# Patient Record
Sex: MALE | Race: AMERICAN INDIAN OR ALASKA NATIVE
[De-identification: names, ages, dates, MRNs, and addresses within clinical notes are randomized per-mention and may not be internally consistent; named-entity substitution may affect disease eponyms.]

---

## 2022-09-06 ENCOUNTER — HOSPITAL ENCOUNTER (OUTPATIENT)
Dept: HOSPITAL 5 - US | Age: 57
Discharge: HOME | End: 2022-09-06
Attending: FAMILY MEDICINE
Payer: COMMERCIAL

## 2022-09-06 DIAGNOSIS — E04.1: Primary | ICD-10-CM

## 2022-09-06 DIAGNOSIS — E05.90: ICD-10-CM

## 2022-09-06 PROCEDURE — 76536 US EXAM OF HEAD AND NECK: CPT

## 2022-09-06 NOTE — ULTRASOUND REPORT
ULTRASOUND THYROID



INDICATION / CLINICAL INFORMATION: Thyrotoxicosis, unspecified E05.90.



COMPARISON: None available.



FINDINGS:



RIGHT LOBE: Size (cm) = 3.8 x 1.4 x 1.5 

LEFT LOBE: Size (cm) = 4.4 x 1.8 x 1.5

ISTHMUS: Thickness (cm) = 0.5 

APPEARANCE: Normal.



SMALL NODULES < 1 cm: 9 mm hypoechoic nodule in the mid left thyroid lobe (TR 4).



NODULES >= 1 cm or SUSPICIOUS FEATURES (up to 4):  

- NODULE # 1

  - Location: Right Upper 

  - Maximum Size (cm): 0.8 

     - Significant Change (>= 20% in 2 dim. & inc. >= 2mm): No prior study

  - Composition: Solid = 2 points

  - Echogenicity: Hypoechoic = 2 points

  - Shape: Taller-than-wide = 3 points

  - Margin: Smooth = 0 points

  - Echogenic Foci: None = 0 points

     - Additional Echogenic Foci: None = 0 points

  - Additional Findings: None.

  - ACR TI-RADS Score / Category = 7+ points / TI-RADS 5 



- No additional suspicious thyroid nodules.



LYMPH NODES: No abnormal lymph nodes.

PARATHYROID GLANDS: No abnormal parathyroid glands.

ADDITIONAL FINDINGS: None.



IMPRESSION:

1. 0.8 cm TR 5 nodule in the right upper lobe. Recommend follow-up ultrasound in one year.

2. No other nodule requiring routine sonographic follow up or fine-needle aspiration, per ACR guideli
tone detailed below.









-------------------------------------------------------------------------------

ACR TI-RADS Thyroid Nodule Recommendations

-------------------------------------------------------------------------------

TI-RADS 1  (0 pts) -- BENIGN. 

  -   No Fine Needle Aspirate biopsy (FNA) or follow-up.

TI-RADS 2  (1-2 pts) -- NOT SUSPICIOUS. 

  -   No FNA or follow-up.

TI-RADS 3  (3 pts) -- MILDLY SUSPICIOUS. 

  -   >= 2.5 cm: FNA. 

  -   1.5-2.4 cm: Follow up at 1, 3, 5 years.

  -   < 1.5 cm: No follow up.

TI-RADS 4  (4-6 pts) -- MODERATELY SUSPICIOUS. 

  -   >= 1.5 cm: FNA. 

  -   1.0-1.4 cm: Follow up at 1, 2, 3, 5 years.

  -   < 1.0 cm: No follow up.

TI-RADS 5  (7+ pts) -- HIGHLY SUSPICIOUS. 

  -   >= 1.0 cm: FNA. 

  -   0.5-0.9 cm: Follow annually for 5 years.

  -   0.5 cm: No follow up.

-------------------------------------------------------------------------------



REFERENCE: ACR Thyroid Imaging, Reporting and Data System (TI-RADS): White Paper of the ACR TI-RADS C
ommittee. J AM Kevin Radiol 2017;14:587-595. 



NOTE: Nodules < 1 cm do not typically require follow-up or FNA unless there are suspicious features. 




NOTE: Nodule size maximum dimension determines whether a given lesion should be biopsied or followed.




NOTE: If multiple nodules meet criteria for FNA, only the two (2) most suspicious nodules should be b
iopsied. In addition, FNA of any suspicious cervical nodes should be biopsied.



NOTE: Predominantly Cystic nodules and Spongiform nodules, composed predominantly (>50%) of small cys
tic spaces, are considered benign (TI-RADS 1) regardless of other criteria. 



Signer Name: Massimo Sandoval MD 

Signed: 9/6/2022 8:42 AM

Workstation Name: [email or web address]